# Patient Record
Sex: MALE | Race: WHITE | NOT HISPANIC OR LATINO | Employment: OTHER | URBAN - METROPOLITAN AREA
[De-identification: names, ages, dates, MRNs, and addresses within clinical notes are randomized per-mention and may not be internally consistent; named-entity substitution may affect disease eponyms.]

---

## 2019-02-13 ENCOUNTER — OFFICE VISIT (OUTPATIENT)
Dept: FAMILY MEDICINE CLINIC | Facility: CLINIC | Age: 64
End: 2019-02-13

## 2019-02-13 VITALS
HEIGHT: 70 IN | DIASTOLIC BLOOD PRESSURE: 86 MMHG | TEMPERATURE: 98.5 F | SYSTOLIC BLOOD PRESSURE: 136 MMHG | WEIGHT: 181 LBS | HEART RATE: 74 BPM | OXYGEN SATURATION: 98 % | BODY MASS INDEX: 25.91 KG/M2 | RESPIRATION RATE: 18 BRPM

## 2019-02-13 DIAGNOSIS — Z13.220 SCREENING FOR HYPERLIPIDEMIA: ICD-10-CM

## 2019-02-13 DIAGNOSIS — B37.0 THRUSH: Primary | ICD-10-CM

## 2019-02-13 PROCEDURE — 99204 OFFICE O/P NEW MOD 45 MIN: CPT | Performed by: NURSE PRACTITIONER

## 2019-02-13 RX ORDER — FLUCONAZOLE 100 MG/1
100 TABLET ORAL DAILY
Qty: 3 TABLET | Refills: 0 | Status: SHIPPED | OUTPATIENT
Start: 2019-02-13 | End: 2019-02-16

## 2019-02-13 NOTE — PROGRESS NOTES
Subjective   Fernando Mccall is a 63 y.o. male.     Chief Complaint   Patient presents with   • Thrush     EST care      Mr. Mccall presents today to establish care at this practice. He is concerned today because he thinks he has thrush. He reports having a white coating on his tongue. He states he had thrush for the first time a year ago after being on several antibiotics for a throat infection. He reports that his mouth stays dry all of the time. He denies a sore throat, difficulty swallowing, fever, chills or body aches.      I have reviewed the patient's medical history in detail and updated the computerized patient record.    The following portions of the patient's history were reviewed and updated as appropriate: allergies, current medications, past family history, past medical history, past social history, past surgical history and problem list.      Current Outpatient Medications:   •  nystatin (MYCOSTATIN) 594348 UNIT/ML suspension, Swish and swallow 5 mL 4 (Four) Times a Day for 7 days., Disp: 140 mL, Rfl: 0     Review of Systems   Constitutional: Negative.    HENT: Negative.    Respiratory: Negative.    Cardiovascular: Negative.    Gastrointestinal: Negative.    Musculoskeletal: Negative.    Skin: Negative.    Neurological: Negative.    All other systems reviewed and are negative.      Objective    Vitals:    02/13/19 1025   BP: 136/86   Pulse: 74   Resp: 18   Temp: 98.5 °F (36.9 °C)   SpO2: 98%     Physical Exam   Constitutional: He is oriented to person, place, and time. He appears well-developed and well-nourished.   HENT:   Right Ear: External ear normal.   Left Ear: External ear normal.   Mouth/Throat: Oropharynx is clear and moist.   Eyes: Conjunctivae and EOM are normal. Pupils are equal, round, and reactive to light.   Neck: Normal range of motion. Neck supple. No JVD present. No thyromegaly present.   Cardiovascular: Normal rate, regular rhythm, normal heart sounds and intact distal pulses.    Pulmonary/Chest: Effort normal and breath sounds normal.   Abdominal: Soft. Bowel sounds are normal.   Musculoskeletal: Normal range of motion. He exhibits no edema.   Lymphadenopathy:     He has no cervical adenopathy.   Neurological: He is alert and oriented to person, place, and time.   Skin: Skin is warm and dry.   Psychiatric:   No acute distress   Vitals reviewed.        Assessment/Plan   Fernando was seen today for thrush.    Diagnoses and all orders for this visit:    Thrush  -     Comprehensive Metabolic Panel  -     CBC & Differential  -     nystatin (MYCOSTATIN) [id number] UNIT/ML suspension; Swish and swallow 5 mL 4 (Four) Times a Day for 7 days.    Screening for hyperlipidemia  -     Lipid Panel With / Chol / HDL Ratio      1. There are no medical records available in the [facility] and [facility] EMR system. He did not provide the name of his previous PCP.  2. Thrush, he is to start Nystatin 10,000 units/ml, 5 ml four times a day for 7 days.   3. He is fasting today and is to have a CMP, CBC and Lipid panel drawn today. I will call him with the results.   4. He is to follow up in 3 months for an annual physical exam.

## 2019-02-14 LAB
ALBUMIN SERPL-MCNC: 4.9 G/DL (ref 3.6–4.8)
ALBUMIN/GLOB SERPL: 1.5 {RATIO} (ref 1.2–2.2)
ALP SERPL-CCNC: 70 IU/L (ref 39–117)
ALT SERPL-CCNC: 30 IU/L (ref 0–44)
AST SERPL-CCNC: 23 IU/L (ref 0–40)
BASOPHILS # BLD AUTO: 0 X10E3/UL (ref 0–0.2)
BASOPHILS NFR BLD AUTO: 0 %
BILIRUB SERPL-MCNC: 0.4 MG/DL (ref 0–1.2)
BUN SERPL-MCNC: 9 MG/DL (ref 8–27)
BUN/CREAT SERPL: 10 (ref 10–24)
CALCIUM SERPL-MCNC: 10.3 MG/DL (ref 8.6–10.2)
CHLORIDE SERPL-SCNC: 104 MMOL/L (ref 96–106)
CHOLEST SERPL-MCNC: 231 MG/DL (ref 100–199)
CHOLEST/HDLC SERPL: 5.5 RATIO (ref 0–5)
CO2 SERPL-SCNC: 20 MMOL/L (ref 20–29)
CREAT SERPL-MCNC: 0.87 MG/DL (ref 0.76–1.27)
EOSINOPHIL # BLD AUTO: 0.2 X10E3/UL (ref 0–0.4)
EOSINOPHIL NFR BLD AUTO: 2 %
ERYTHROCYTE [DISTWIDTH] IN BLOOD BY AUTOMATED COUNT: 14 % (ref 12.3–15.4)
GLOBULIN SER CALC-MCNC: 3.3 G/DL (ref 1.5–4.5)
GLUCOSE SERPL-MCNC: 105 MG/DL (ref 65–99)
HCT VFR BLD AUTO: 46.6 % (ref 37.5–51)
HDLC SERPL-MCNC: 42 MG/DL
HGB BLD-MCNC: 16 G/DL (ref 13–17.7)
IMM GRANULOCYTES # BLD AUTO: 0 X10E3/UL (ref 0–0.1)
IMM GRANULOCYTES NFR BLD AUTO: 0 %
LDLC SERPL CALC-MCNC: 140 MG/DL (ref 0–99)
LYMPHOCYTES # BLD AUTO: 2.1 X10E3/UL (ref 0.7–3.1)
LYMPHOCYTES NFR BLD AUTO: 24 %
MCH RBC QN AUTO: 29.3 PG (ref 26.6–33)
MCHC RBC AUTO-ENTMCNC: 34.3 G/DL (ref 31.5–35.7)
MCV RBC AUTO: 85 FL (ref 79–97)
MONOCYTES # BLD AUTO: 0.5 X10E3/UL (ref 0.1–0.9)
MONOCYTES NFR BLD AUTO: 6 %
NEUTROPHILS # BLD AUTO: 6 X10E3/UL (ref 1.4–7)
NEUTROPHILS NFR BLD AUTO: 68 %
PLATELET # BLD AUTO: 389 X10E3/UL (ref 150–379)
POTASSIUM SERPL-SCNC: 4.4 MMOL/L (ref 3.5–5.2)
PROT SERPL-MCNC: 8.2 G/DL (ref 6–8.5)
RBC # BLD AUTO: 5.47 X10E6/UL (ref 4.14–5.8)
SODIUM SERPL-SCNC: 141 MMOL/L (ref 134–144)
TRIGL SERPL-MCNC: 245 MG/DL (ref 0–149)
VLDLC SERPL CALC-MCNC: 49 MG/DL (ref 5–40)
WBC # BLD AUTO: 8.9 X10E3/UL (ref 3.4–10.8)

## 2019-02-15 DIAGNOSIS — E78.2 MIXED HYPERLIPIDEMIA: Primary | ICD-10-CM

## 2019-02-15 PROBLEM — D69.1 ABNORMAL PLATELETS (HCC): Status: ACTIVE | Noted: 2019-02-15

## 2019-02-15 PROBLEM — R73.09 ELEVATED GLUCOSE: Status: ACTIVE | Noted: 2019-02-15

## 2019-02-15 RX ORDER — ATORVASTATIN CALCIUM 20 MG/1
20 TABLET, FILM COATED ORAL DAILY
Qty: 30 TABLET | Refills: 5 | Status: SHIPPED | OUTPATIENT
Start: 2019-02-15

## 2019-02-15 NOTE — PROGRESS NOTES
Please call the patient regarding his abnormal result. Lipid levels are elevated and I am starting him on generic Lipitor. Glucose level is slightly elevated and so are his platelets. At this time we will recheck his labs in 6 months. He is to make an appointment to see me then with fasting labs the week before.